# Patient Record
Sex: FEMALE
[De-identification: names, ages, dates, MRNs, and addresses within clinical notes are randomized per-mention and may not be internally consistent; named-entity substitution may affect disease eponyms.]

---

## 2018-02-11 ENCOUNTER — TRANSCRIPTION ENCOUNTER (OUTPATIENT)
Age: 23
End: 2018-02-11

## 2020-08-06 ENCOUNTER — IMPORTED ENCOUNTER (OUTPATIENT)
Dept: URBAN - METROPOLITAN AREA CLINIC 1 | Facility: CLINIC | Age: 25
End: 2020-08-06

## 2020-08-06 PROBLEM — H40.023: Noted: 2020-08-06

## 2020-08-06 PROBLEM — H40.013: Noted: 2020-08-06

## 2020-08-06 PROBLEM — H01.134: Noted: 2020-08-06

## 2020-08-06 PROCEDURE — 92250 FUNDUS PHOTOGRAPHY W/I&R: CPT

## 2020-08-06 PROCEDURE — 92004 COMPRE OPH EXAM NEW PT 1/>: CPT

## 2020-08-06 PROCEDURE — 92015 DETERMINE REFRACTIVE STATE: CPT

## 2020-08-06 NOTE — PATIENT DISCUSSION
1.  Glaucoma Suspect OU : Patient is considered high risk. Condition was discussed with patient and patient understands. Will continue to monitor patient for any progression in condition. Patient was advised to call us with any problems questions or concerns. Disc photo done today. 2.  Eczematous Dermatitis OS- Begin Lotemax (Erx'd) Ointment BID OS. MRX for glasses given. Return for an appointment in 1 month 10/OCT/VF  with Dr. Melinda Mann.

## 2020-08-06 NOTE — PATIENT DISCUSSION
1. Eczematous Dermatitis OS- Begin Lotemax (Erx'd) Ointment BID OS. MRX for glasses given. Return for an appointment in 1 month 10/OCT/VF  with Dr. Sarbjit Freeman.

## 2020-09-21 ENCOUNTER — IMPORTED ENCOUNTER (OUTPATIENT)
Dept: URBAN - METROPOLITAN AREA CLINIC 1 | Facility: CLINIC | Age: 25
End: 2020-09-21

## 2020-09-21 PROBLEM — H40.023: Noted: 2020-09-21

## 2020-09-21 PROCEDURE — 92012 INTRM OPH EXAM EST PATIENT: CPT

## 2020-09-21 PROCEDURE — 92083 EXTENDED VISUAL FIELD XM: CPT

## 2020-09-21 PROCEDURE — 92133 CPTRZD OPH DX IMG PST SGM ON: CPT

## 2020-09-21 NOTE — PATIENT DISCUSSION
1.  Glaucoma Suspect OU -- (C/D: 0.60/0.70) IOP: 17 OU. OCT Today shows minimal thinning OU. VF Today WNL Stable. Patient is considered high risk. Condition was discussed with patient and patient understands. Will continue to monitor patient for any progression in condition. Patient was advised to call us with any problems questions or concerns. 2.  Eczematous Dermatitis OS -- Cont Triamcinolone Acetonide Ointment BID OS. Return for an appointment in 1 year 30/OCT with Dr. Catie Nunez.

## 2021-10-07 ENCOUNTER — IMPORTED ENCOUNTER (OUTPATIENT)
Dept: URBAN - METROPOLITAN AREA CLINIC 1 | Facility: CLINIC | Age: 26
End: 2021-10-07

## 2021-10-07 PROBLEM — H40.023: Noted: 2021-10-07

## 2021-10-07 PROCEDURE — 92014 COMPRE OPH EXAM EST PT 1/>: CPT

## 2021-10-07 PROCEDURE — 92015 DETERMINE REFRACTIVE STATE: CPT

## 2021-10-07 PROCEDURE — 92133 CPTRZD OPH DX IMG PST SGM ON: CPT

## 2021-10-07 NOTE — PATIENT DISCUSSION
1.  Glaucoma Suspect OU -- (C/D: 0.60/0.70) IOP: 13 OU. OCT stable OU. () FHx. Patient is considered high risk. Condition was discussed with patient and patient understands. Will continue to monitor patient for any progression in condition. Patient was advised to call us with any problems questions or concerns. ***Testing to be done every other year per [facility]***2.  Eczematous Dermatitis OS -- Cont Triamcinolone Acetonide Ointment BID OS. MRX for glasses given. Return for an appointment in 1 year 27 with Dr. Alexia Zuniga.

## 2022-04-02 ASSESSMENT — KERATOMETRY
OD_K2POWER_DIOPTERS: 43.00
OD_K1POWER_DIOPTERS: 42.75
OS_K1POWER_DIOPTERS: 43.00
OS_AXISANGLE2_DEGREES: 095
OD_AXISANGLE2_DEGREES: 075
OS_K2POWER_DIOPTERS: 44.00
OS_AXISANGLE_DEGREES: 005
OD_AXISANGLE_DEGREES: 165

## 2022-04-02 ASSESSMENT — VISUAL ACUITY
OS_CC: J1+
OS_SC: 20/20
OS_SC: 20/20
OD_SC: 20/20
OS_CC: 20/100
OD_CC: 20/100
OD_SC: 20/20-1
OD_CC: J1+

## 2022-04-02 ASSESSMENT — TONOMETRY
OD_IOP_MMHG: 17
OS_IOP_MMHG: 17
OD_IOP_MMHG: 16
OS_IOP_MMHG: 16
OS_IOP_MMHG: 13
OD_IOP_MMHG: 13

## 2022-10-07 ENCOUNTER — COMPREHENSIVE EXAM (OUTPATIENT)
Dept: URBAN - METROPOLITAN AREA CLINIC 1 | Facility: CLINIC | Age: 27
End: 2022-10-07

## 2022-10-07 DIAGNOSIS — H40.023: ICD-10-CM

## 2022-10-07 PROCEDURE — 92014 COMPRE OPH EXAM EST PT 1/>: CPT

## 2022-10-07 PROCEDURE — 92015 DETERMINE REFRACTIVE STATE: CPT

## 2022-10-07 ASSESSMENT — TONOMETRY
OD_IOP_MMHG: 14
OS_IOP_MMHG: 14

## 2022-10-07 ASSESSMENT — VISUAL ACUITY
OS_CC: 20/20
OD_CC: 20/20
OD_CC: J1+
OS_CC: J1+

## 2022-10-07 NOTE — PATIENT DISCUSSION
(CD .65/.70)Past work-up (-). IOP 14 OU.  Patient is considered high risk. Condition was discussed with patient and patient understands. Will continue to monitor patient for any progression in condition. Patient was advised to call us with any problems, questions, or concerns. patient will follow up in 1 year for 30/OCT.

## 2023-05-22 RX ORDER — ALBUTEROL SULFATE 90 UG/1
2 AEROSOL, METERED RESPIRATORY (INHALATION) EVERY 4 HOURS PRN
COMMUNITY
Start: 2021-12-13

## 2023-05-22 RX ORDER — METRONIDAZOLE 500 MG/1
500 TABLET ORAL 3 TIMES DAILY
COMMUNITY

## 2023-05-22 RX ORDER — VALACYCLOVIR HYDROCHLORIDE 500 MG/1
500 TABLET, FILM COATED ORAL 2 TIMES DAILY
COMMUNITY

## 2023-05-22 RX ORDER — IBUPROFEN 600 MG/1
600 TABLET ORAL EVERY 8 HOURS PRN
COMMUNITY
Start: 2020-10-29

## 2023-05-22 RX ORDER — ONDANSETRON 4 MG/1
4 TABLET, ORALLY DISINTEGRATING ORAL EVERY 8 HOURS PRN
COMMUNITY
Start: 2022-05-31

## 2023-05-22 RX ORDER — PREDNISONE 10 MG/1
TABLET ORAL
COMMUNITY
Start: 2021-12-13

## 2023-10-13 ENCOUNTER — COMPREHENSIVE EXAM (OUTPATIENT)
Dept: URBAN - METROPOLITAN AREA CLINIC 1 | Facility: CLINIC | Age: 28
End: 2023-10-13

## 2023-10-13 DIAGNOSIS — H40.023: ICD-10-CM

## 2023-10-13 PROCEDURE — 92133 CPTRZD OPH DX IMG PST SGM ON: CPT

## 2023-10-13 PROCEDURE — 92015 DETERMINE REFRACTIVE STATE: CPT

## 2023-10-13 PROCEDURE — 99214 OFFICE O/P EST MOD 30 MIN: CPT

## 2023-10-13 ASSESSMENT — TONOMETRY
OS_IOP_MMHG: 16
OD_IOP_MMHG: 16

## 2023-10-13 ASSESSMENT — VISUAL ACUITY
OD_CC: 20/20-2
OD_CC: J1+
OS_CC: J1+
OD_SC: 20/80-1
OS_SC: 20/80-1
OS_CC: 20/20-1

## 2025-01-08 ENCOUNTER — COMPREHENSIVE EXAM (OUTPATIENT)
Age: 30
End: 2025-01-08

## 2025-01-08 DIAGNOSIS — H40.023: ICD-10-CM

## 2025-01-08 DIAGNOSIS — H52.13: ICD-10-CM

## 2025-01-08 PROCEDURE — 92015 DETERMINE REFRACTIVE STATE: CPT

## 2025-01-08 PROCEDURE — 92014 COMPRE OPH EXAM EST PT 1/>: CPT

## 2025-01-08 PROCEDURE — 92083 EXTENDED VISUAL FIELD XM: CPT
